# Patient Record
(demographics unavailable — no encounter records)

---

## 2024-11-11 NOTE — HEALTH RISK ASSESSMENT
[Excellent] : ~his/her~  mood as  excellent [Yes] : Yes [Monthly or less (1 pt)] : Monthly or less (1 point) [1 or 2 (0 pts)] : 1 or 2 (0 points) [Never (0 pts)] : Never (0 points) [No] : In the past 12 months have you used drugs other than those required for medical reasons? No [No falls in past year] : Patient reported no falls in the past year [0] : 2) Feeling down, depressed, or hopeless: Not at all (0) [Never] : Never [None] : None [With Significant Other] : lives with significant other [Employed] : employed [] :  [# Of Children ___] : has [unfilled] children [Sexually Active] : sexually active [Feels Safe at Home] : Feels safe at home [Fully functional (bathing, dressing, toileting, transferring, walking, feeding)] : Fully functional (bathing, dressing, toileting, transferring, walking, feeding) [Fully functional (using the telephone, shopping, preparing meals, housekeeping, doing laundry, using] : Fully functional and needs no help or supervision to perform IADLs (using the telephone, shopping, preparing meals, housekeeping, doing laundry, using transportation, managing medications and managing finances) [Reports normal functional visual acuity (ie: able to read med bottle)] : Reports normal functional visual acuity [Smoke Detector] : smoke detector [Seat Belt] :  uses seat belt [Sunscreen] : uses sunscreen [PHQ-2 Negative - No further assessment needed] : PHQ-2 Negative - No further assessment needed [FreeTextEntry1] : Slight swelling right neck, non tender, not painful. [de-identified] : none [Audit-CScore] : 1 [de-identified] : strength training/cardio 3-4x weeks [de-identified] : regular diet  [[id number]] : 0 [Change in mental status noted] : No change in mental status noted [Language] : denies difficulty with language [Behavior] : denies difficulty with behavior [Reports changes in hearing] : Reports no changes in hearing [Reports changes in vision] : Reports no changes in vision [Reports changes in dental health] : Reports no changes in dental health [Travel to Developing Areas] : does not  travel to developing areas

## 2024-11-11 NOTE — REASON FOR VISIT
[facility]  ORTHOPEDIC OFFICE VISIT    CHIEF COMPLAINT:  Office Visit (MRI results)      SUBJECTIVE:  Michelle De La Garza is a 76 year old female who presented requesting a follow up visit to review MRI results of her lumbar spine.  Patient reports pain in her right low back and hip that radiates down the outside of her leg.  Sometimes she feels pain in her groin.  She has been having blood pressure problems and needs to delay an injection.      PROBLEM LIST:  Patient Active Problem List   Diagnosis    Benign hypertension    Iliotibial band syndrome    Trochanteric bursitis of right hip    Ganglion of left ankle    Combined forms of age-related cataract of both eyes    Pes planovalgus    Synovitis of left foot    Mass of left ankle    Chest pain    Glaucoma suspect of both eyes    Dry eye syndrome of both eyes due to meibomian gland dysfunction    Chalazion of left lower eyelid    Astigmatism of both eyes with presbyopia    Food allergy       HISTORIES:  I have reviewed the past medical history, family history, social history, medications and allergies listed in the medical record as obtained by my nursing staff and support staff and agree with their documentation.  Social History     Tobacco Use    Smoking status: Never    Smokeless tobacco: Never   Substance Use Topics    Alcohol use: No       OBJECTIVE:  PHYSICAL EXAMINATION:  Vitals: There were no vitals taken for this visit.  Constitutional:  Well-developed, well-nourished female in no acute distress.  Skin: Warm, dry, intact without rash or lesion.  Neurologic:  Alert and oriented x3.  Lungs:  Respirations unlabored  Musculoskeletal:  Right hip  Can flex beyond 90 °    IMAGING STUDIES:  MRI results of the lumbar spine were reviewed.  These demonstrate spinal stenosis      ASSESSMENT:  Right hip arthritis with possible bursitis   Lumbar spinal stenosis      PLAN:  Discussed a Toradol injection for the right hip with the patient.  The patient  needs to wait for an injection due to her blood pressure problems.    Discussed low impact activities like swimming and an elliptical machine.    We will reach out to Dr. Puentes for a Toradol injection when needed.    Follow up as needed for right hip.        No orders of the defined types were placed in this encounter.    Instructions provided as documented in the after visit summary.    The patient indicated understanding of the diagnosis and agreed with the plan of care.    On 9/23/2024, IYovana scribed the services personally performed by Jerrell Hong MD.      The documentation recorded by the scribe accurately and completely reflects the service(s) I personally performed and the decisions made by me.      [Annual Wellness Visit] : an annual wellness visit

## 2024-11-11 NOTE — PLAN
[FreeTextEntry1] : Reduce high cholesterol containing foods. Declining influenza vaccine at this time, maybe in a few weeks. Observe red swelling, in it doesn't reduce in size, or changes appearance, will pursue further eval: U/s.